# Patient Record
Sex: FEMALE | Race: ASIAN | NOT HISPANIC OR LATINO | URBAN - METROPOLITAN AREA
[De-identification: names, ages, dates, MRNs, and addresses within clinical notes are randomized per-mention and may not be internally consistent; named-entity substitution may affect disease eponyms.]

---

## 2019-02-19 ENCOUNTER — INPATIENT (INPATIENT)
Facility: HOSPITAL | Age: 24
LOS: 2 days | Discharge: HOME | End: 2019-02-22
Attending: PSYCHIATRY & NEUROLOGY | Admitting: PSYCHIATRY & NEUROLOGY
Payer: MEDICAID

## 2019-02-19 VITALS
TEMPERATURE: 98 F | OXYGEN SATURATION: 100 % | HEART RATE: 70 BPM | SYSTOLIC BLOOD PRESSURE: 115 MMHG | RESPIRATION RATE: 14 BRPM | DIASTOLIC BLOOD PRESSURE: 75 MMHG

## 2019-02-19 DIAGNOSIS — T14.91XA SUICIDE ATTEMPT, INITIAL ENCOUNTER: ICD-10-CM

## 2019-02-19 DIAGNOSIS — F32.9 MAJOR DEPRESSIVE DISORDER, SINGLE EPISODE, UNSPECIFIED: ICD-10-CM

## 2019-02-19 LAB
ALBUMIN SERPL ELPH-MCNC: 4.6 G/DL — SIGNIFICANT CHANGE UP (ref 3.3–5)
ALP SERPL-CCNC: 61 U/L — SIGNIFICANT CHANGE UP (ref 40–120)
ALT FLD-CCNC: 13 U/L — SIGNIFICANT CHANGE UP (ref 4–33)
AMPHET UR-MCNC: NEGATIVE — SIGNIFICANT CHANGE UP
ANION GAP SERPL CALC-SCNC: 15 MMO/L — HIGH (ref 7–14)
APAP SERPL-MCNC: < 15 UG/ML — LOW (ref 15–25)
APAP SERPL-MCNC: < 15 UG/ML — LOW (ref 15–25)
APTT BLD: 25.5 SEC — LOW (ref 27.5–36.3)
AST SERPL-CCNC: 20 U/L — SIGNIFICANT CHANGE UP (ref 4–32)
BARBITURATES UR SCN-MCNC: NEGATIVE — SIGNIFICANT CHANGE UP
BASE EXCESS BLDV CALC-SCNC: 2.2 MMOL/L — SIGNIFICANT CHANGE UP
BASE EXCESS BLDV CALC-SCNC: 2.5 MMOL/L — SIGNIFICANT CHANGE UP
BASOPHILS # BLD AUTO: 0.04 K/UL — SIGNIFICANT CHANGE UP (ref 0–0.2)
BASOPHILS NFR BLD AUTO: 0.6 % — SIGNIFICANT CHANGE UP (ref 0–2)
BENZODIAZ UR-MCNC: NEGATIVE — SIGNIFICANT CHANGE UP
BILIRUB SERPL-MCNC: 0.6 MG/DL — SIGNIFICANT CHANGE UP (ref 0.2–1.2)
BLOOD GAS VENOUS - CREATININE: 0.69 MG/DL — SIGNIFICANT CHANGE UP (ref 0.5–1.3)
BLOOD GAS VENOUS - CREATININE: 0.71 MG/DL — SIGNIFICANT CHANGE UP (ref 0.5–1.3)
BUN SERPL-MCNC: 12 MG/DL — SIGNIFICANT CHANGE UP (ref 7–23)
CALCIUM SERPL-MCNC: 9.6 MG/DL — SIGNIFICANT CHANGE UP (ref 8.4–10.5)
CANNABINOIDS UR-MCNC: NEGATIVE — SIGNIFICANT CHANGE UP
CHLORIDE BLDV-SCNC: 108 MMOL/L — SIGNIFICANT CHANGE UP (ref 96–108)
CHLORIDE BLDV-SCNC: 108 MMOL/L — SIGNIFICANT CHANGE UP (ref 96–108)
CHLORIDE SERPL-SCNC: 100 MMOL/L — SIGNIFICANT CHANGE UP (ref 98–107)
CO2 SERPL-SCNC: 26 MMOL/L — SIGNIFICANT CHANGE UP (ref 22–31)
COCAINE METAB.OTHER UR-MCNC: NEGATIVE — SIGNIFICANT CHANGE UP
CREAT SERPL-MCNC: 0.77 MG/DL — SIGNIFICANT CHANGE UP (ref 0.5–1.3)
EOSINOPHIL # BLD AUTO: 0.03 K/UL — SIGNIFICANT CHANGE UP (ref 0–0.5)
EOSINOPHIL NFR BLD AUTO: 0.4 % — SIGNIFICANT CHANGE UP (ref 0–6)
ETHANOL BLD-MCNC: < 10 MG/DL — SIGNIFICANT CHANGE UP
GAS PNL BLDV: 138 MMOL/L — SIGNIFICANT CHANGE UP (ref 136–146)
GAS PNL BLDV: 139 MMOL/L — SIGNIFICANT CHANGE UP (ref 136–146)
GLUCOSE BLDV-MCNC: 100 — HIGH (ref 70–99)
GLUCOSE BLDV-MCNC: 84 — SIGNIFICANT CHANGE UP (ref 70–99)
GLUCOSE SERPL-MCNC: 98 MG/DL — SIGNIFICANT CHANGE UP (ref 70–99)
HCG UR-SCNC: NEGATIVE — SIGNIFICANT CHANGE UP
HCO3 BLDV-SCNC: 25 MMOL/L — SIGNIFICANT CHANGE UP (ref 20–27)
HCO3 BLDV-SCNC: 26 MMOL/L — SIGNIFICANT CHANGE UP (ref 20–27)
HCT VFR BLD CALC: 40.9 % — SIGNIFICANT CHANGE UP (ref 34.5–45)
HCT VFR BLDV CALC: 36.6 % — SIGNIFICANT CHANGE UP (ref 34.5–45)
HCT VFR BLDV CALC: 39.6 % — SIGNIFICANT CHANGE UP (ref 34.5–45)
HGB BLD-MCNC: 13.1 G/DL — SIGNIFICANT CHANGE UP (ref 11.5–15.5)
HGB BLDV-MCNC: 11.9 G/DL — SIGNIFICANT CHANGE UP (ref 11.5–15.5)
HGB BLDV-MCNC: 12.9 G/DL — SIGNIFICANT CHANGE UP (ref 11.5–15.5)
IMM GRANULOCYTES NFR BLD AUTO: 0.1 % — SIGNIFICANT CHANGE UP (ref 0–1.5)
INR BLD: 1.03 — SIGNIFICANT CHANGE UP (ref 0.88–1.17)
LACTATE BLDV-MCNC: 1.9 MMOL/L — SIGNIFICANT CHANGE UP (ref 0.5–2)
LACTATE BLDV-MCNC: 2.5 MMOL/L — HIGH (ref 0.5–2)
LYMPHOCYTES # BLD AUTO: 1.86 K/UL — SIGNIFICANT CHANGE UP (ref 1–3.3)
LYMPHOCYTES # BLD AUTO: 27.5 % — SIGNIFICANT CHANGE UP (ref 13–44)
MCHC RBC-ENTMCNC: 29.8 PG — SIGNIFICANT CHANGE UP (ref 27–34)
MCHC RBC-ENTMCNC: 32 % — SIGNIFICANT CHANGE UP (ref 32–36)
MCV RBC AUTO: 93.2 FL — SIGNIFICANT CHANGE UP (ref 80–100)
METHADONE UR-MCNC: NEGATIVE — SIGNIFICANT CHANGE UP
MONOCYTES # BLD AUTO: 0.47 K/UL — SIGNIFICANT CHANGE UP (ref 0–0.9)
MONOCYTES NFR BLD AUTO: 6.9 % — SIGNIFICANT CHANGE UP (ref 2–14)
NEUTROPHILS # BLD AUTO: 4.36 K/UL — SIGNIFICANT CHANGE UP (ref 1.8–7.4)
NEUTROPHILS NFR BLD AUTO: 64.5 % — SIGNIFICANT CHANGE UP (ref 43–77)
NRBC # FLD: 0 K/UL — LOW (ref 25–125)
OPIATES UR-MCNC: NEGATIVE — SIGNIFICANT CHANGE UP
OXYCODONE UR-MCNC: NEGATIVE — SIGNIFICANT CHANGE UP
PCO2 BLDV: 46 MMHG — SIGNIFICANT CHANGE UP (ref 41–51)
PCO2 BLDV: 48 MMHG — SIGNIFICANT CHANGE UP (ref 41–51)
PCP UR-MCNC: NEGATIVE — SIGNIFICANT CHANGE UP
PH BLDV: 7.37 PH — SIGNIFICANT CHANGE UP (ref 7.32–7.43)
PH BLDV: 7.39 PH — SIGNIFICANT CHANGE UP (ref 7.32–7.43)
PLATELET # BLD AUTO: 248 K/UL — SIGNIFICANT CHANGE UP (ref 150–400)
PMV BLD: 9.4 FL — SIGNIFICANT CHANGE UP (ref 7–13)
PO2 BLDV: 30 MMHG — LOW (ref 35–40)
PO2 BLDV: 47 MMHG — HIGH (ref 35–40)
POTASSIUM BLDV-SCNC: 3.3 MMOL/L — LOW (ref 3.4–4.5)
POTASSIUM BLDV-SCNC: 3.4 MMOL/L — SIGNIFICANT CHANGE UP (ref 3.4–4.5)
POTASSIUM SERPL-MCNC: 3.4 MMOL/L — LOW (ref 3.5–5.3)
POTASSIUM SERPL-SCNC: 3.4 MMOL/L — LOW (ref 3.5–5.3)
PROT SERPL-MCNC: 8.1 G/DL — SIGNIFICANT CHANGE UP (ref 6–8.3)
PROTHROM AB SERPL-ACNC: 11.8 SEC — SIGNIFICANT CHANGE UP (ref 9.8–13.1)
RBC # BLD: 4.39 M/UL — SIGNIFICANT CHANGE UP (ref 3.8–5.2)
RBC # FLD: 11.9 % — SIGNIFICANT CHANGE UP (ref 10.3–14.5)
SALICYLATES SERPL-MCNC: 8.8 MG/DL — LOW (ref 15–30)
SALICYLATES SERPL-MCNC: < 5 MG/DL — LOW (ref 15–30)
SAO2 % BLDV: 52.1 % — LOW (ref 60–85)
SAO2 % BLDV: 80.9 % — SIGNIFICANT CHANGE UP (ref 60–85)
SODIUM SERPL-SCNC: 141 MMOL/L — SIGNIFICANT CHANGE UP (ref 135–145)
SP GR UR: 1.01 — SIGNIFICANT CHANGE UP (ref 1–1.03)
T3 SERPL-MCNC: 91.5 NG/DL — SIGNIFICANT CHANGE UP (ref 80–200)
T4 AB SER-ACNC: 6.03 UG/DL — SIGNIFICANT CHANGE UP (ref 5.1–13)
TSH SERPL-MCNC: 3.36 UIU/ML — SIGNIFICANT CHANGE UP (ref 0.27–4.2)
WBC # BLD: 6.77 K/UL — SIGNIFICANT CHANGE UP (ref 3.8–10.5)
WBC # FLD AUTO: 6.77 K/UL — SIGNIFICANT CHANGE UP (ref 3.8–10.5)

## 2019-02-19 PROCEDURE — 99285 EMERGENCY DEPT VISIT HI MDM: CPT | Mod: GC

## 2019-02-19 RX ORDER — ACETAMINOPHEN 500 MG
650 TABLET ORAL ONCE
Qty: 0 | Refills: 0 | Status: DISCONTINUED | OUTPATIENT
Start: 2019-02-19 | End: 2019-02-19

## 2019-02-19 RX ORDER — SODIUM CHLORIDE 9 MG/ML
1000 INJECTION, SOLUTION INTRAVENOUS ONCE
Qty: 0 | Refills: 0 | Status: COMPLETED | OUTPATIENT
Start: 2019-02-19 | End: 2019-02-19

## 2019-02-19 RX ADMIN — SODIUM CHLORIDE 1000 MILLILITER(S): 9 INJECTION, SOLUTION INTRAVENOUS at 11:51

## 2019-02-19 NOTE — ED BEHAVIORAL HEALTH ASSESSMENT NOTE - DESCRIPTION
Patient given a bolus of lactated ringers. The patient was calm and cooperative. Patient has a history of an ovarian cyst. Patient lives with her family. 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 She is reportedly a straight A student. She babysits as well. She identifies as female and heterosexual. Patient given a bolus of lactated ringers. The patient was calm and cooperative, no agitation, no prns required.    Vital Signs Last 24 Hrs  T(C): 36.8 (19 Feb 2019 20:45), Max: 36.8 (19 Feb 2019 20:45)  T(F): 98.3 (19 Feb 2019 20:45), Max: 98.3 (19 Feb 2019 20:45)  HR: 62 (19 Feb 2019 20:45) (62 - 75)  BP: 104/71 (19 Feb 2019 20:45) (96/58 - 135/72)  BP(mean): --  RR: 18 (19 Feb 2019 21:51) (14 - 18)  SpO2: 100% (19 Feb 2019 20:45) (100% - 100%)

## 2019-02-19 NOTE — ED BEHAVIORAL HEALTH ASSESSMENT NOTE - DETAILS
Patient attempted suicide by overdose this morning on Excedrin. Patient's father has DM. Spoke to patient's family. Handoff provided to Dr. Vasquez. Handoff provided to [facility], Dr. Vasquez.

## 2019-02-19 NOTE — ED ADULT TRIAGE NOTE - CHIEF COMPLAINT QUOTE
Pt took 15 excedrin at 0840h.  Says she feels depressed.  No PMH.  C/O CP.  Denies SOB.  Denies drug/alcohol use, hallucinations or hearing voices Pt took 15 excedrin at 0840h.  Says she feels depressed.  Says she wanted to kill herself.  No PMH.  C/O CP.  Denies SOB.  Denies drug/alcohol use, hallucinations or hearing voices

## 2019-02-19 NOTE — ED PROVIDER NOTE - CLINICAL SUMMARY MEDICAL DECISION MAKING FREE TEXT BOX
23f here for attempted suicide w ingestion of ~15 excedrin pills 830 this am. Tearful on exam, endorsing persistent SI. No evidence trauma. Appears anxious and depressed w poor eye contact. Otherwise benign exam, w nl reactive pupils, no diaphoresis/dry skin, no resp distress + clear lungs, soft NT abd. Will check urine and serum tox, upreg, basic labs incl vbg, one-to-one, likely adm to psych if no evidence severe intox
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
## 2019-02-19 NOTE — ED ADULT NURSE NOTE - CHIEF COMPLAINT QUOTE
Pt took 15 excedrin at 0840h.  Says she feels depressed.  Says she wanted to kill herself.  No PMH.  C/O CP.  Denies SOB.  Denies drug/alcohol use, hallucinations or hearing voices

## 2019-02-19 NOTE — ED ADULT NURSE REASSESSMENT NOTE - NS ED NURSE REASSESS COMMENT FT1
pt admitted to [facility] for suicidal attempt. pt received on CO with ed tech at bedside pt remains safe and in NAD. pt medically cleared by MD Crowell who states pt safe to be transferred to [facility]. pt report gvn to Susan DICKERSON on [facility] pt IV removed. waiting for transport with security.

## 2019-02-19 NOTE — ED PROVIDER NOTE - ATTENDING CONTRIBUTION TO CARE
24yo f no pmh here for suicide attempt by ingestion of Excedrin. Pt states she took 10-15 (not sure exact #) pills of Excedrin this morning at ~8:30 in attempt to end her life.  Physical complaints currently include headache, chest pain and abd pain, all of which pt states were present prior to ingestion as well, which she attributes to not getting sleep last few nights. When asked why states has been busy studying and working. Denies SOB, vomiting (though has nausea). No tinnitus. Denies ingestion of any other substance today. Occasional cigarette and e-cig smoker ~1x/week, drinks ETOH a few times/year for special occasions, denies other drug use. States has been feeling sad for some time but does not elaborate on why or exactly how long. Lives at home w family w whom feels safe. Has talked about feeling sad with boyfriend and friends but never w any professional. Currently still endorsing SI, which states has attempted previously but will not elaborate. No HI. On exam: VSS + depressed,  lungs, heart, pulses, abdomen, neuro, extremities, skin, all normal on exam.  IMP: Suicide attempt with Excedrin (acetaminophen, aspirin, caffeine) Likely non lethal dose based on history. Plan: 1:1 psych hold, labs tox EKG monitor, consult Tox, psych consult when cleared medically

## 2019-02-19 NOTE — ED PROVIDER NOTE - PROGRESS NOTE DETAILS
4h levels acetaminophen and salicylate negative (time stamp not representative of when labs were drawn). Pt medically stable, called psych, will come to eval AJM: pt seen by  and will at admit to . medically cleared

## 2019-02-19 NOTE — ED PROVIDER NOTE - OBJECTIVE STATEMENT
22yo f no pmh here for suicide attempt by ingestion of Excedrin. Pt states she took 10-15 (not sure exact #) pills of Excedrin this morning at ~8:30 in attempt to end her life.  Physical complaints currently include headache, chest pain and abd pain, all of which pt states were present prior to ingestion as well, which she attributes to not getting sleep last few nights. When asked why  has been busy studying and working. Denies SOB, vomiting (though has nausea). No tinnitus. Denies ingestion of any other substance today. Occasional cigarette and e-cig smoker ~1x/week, drinks ETOH a few times/year for special occasions, denies other drug use.  has been feeling sad for some time but does not elaborate on why or exactly how long. Lives at home w family w whom feels safe. Has talked about feeling sad with boyfriend and friends but never w any professional. Currently still endorsing SI, which  has attempted previously but will not elaborate. No HI.

## 2019-02-19 NOTE — CONSULT NOTE ADULT - SUBJECTIVE AND OBJECTIVE BOX
MEDICAL TOXICOLOGY CONSULT    HPI: 22 yo F no PMH p/w intentional OD of handful of excedrin (ASA, APAP, caffeine) in a single ingestion around 8-8:30am. Denies co-ingestion. Reports nausea w/o vomiting, generalized headache, CP, SOB, and diffuse abdominal pain but reports all of these symptoms starting yesterday. Denies any ingestions prior to today. She is not on any Rx or herbals and occasionally takes Excedrin for headache. Father also at the bedside - reports patient w/o a history of psych, suicidal ideation, overdose in the past and is surprised that she attempted suicide. In the ED - patient HD stable, not appearing sympathomimetic, and no Kussmaul breathing. Labs pending.     ONSET / TIME of exposure(s): 8-8:30 am today  QUANTITY of exposure(s): "Handful"   ROUTE of exposure:  PO  CONTEXT of exposure: Intentional suicide      PAST MEDICAL & SURGICAL HISTORY:  No pertinent past medical history      MEDICATION HISTORY: Denies    RECREATIONAL / ETHANOL / SUPPLEMENT USE: Denies tobacco, EtOH, illicit drug use, Herbal/Supplements    ALLERGIES: Kiwi (Angioedema)  No Known Drug Allergies      SOCIAL Hx:  Lives at home with parents. No social concerns per father or patient    FAMILY HISTORY: Non-Contributory    REVIEW OF SYSTEMS:   General:  no fever, chills, malaise,   Eyes:  no blurry vision, double vision,   ENT:  no tinnitus, decreased acuity, epistaxis, sore throat, dysphagia  Cardiac: +chest pain, (-) syncope, or palpitations  Lung:  no cough, (+) shortness of breath, (-) stridor, or wheezing  GI:  (+) abdominal pain, (+) nausea,(-)  vomiting, diarrhea, or constipation  Neuro:  no headache, weakness/numbness, ataxia, change in speech, dizziness, tremor, or seizure  Psych: (+) recent depression/anxiety ; (+) suicidal ideation/attempts, (-) homicideal       PHYSICAL EXAM  Vital Signs Last 24 Hrs  T(C): 36.6 (19 Feb 2019 10:03), Max: 36.6 (19 Feb 2019 10:03)  T(F): 97.9 (19 Feb 2019 10:03), Max: 97.9 (19 Feb 2019 10:03)  HR: 75 (19 Feb 2019 11:54) (70 - 75)  BP: 135/72 (19 Feb 2019 11:54) (115/75 - 135/72)  BP(mean): --  RR: 16 (19 Feb 2019 11:54) (14 - 16)  SpO2: 100% (19 Feb 2019 11:54) (100% - 100%)  General:  Sitting up, in no distress  Head:  normocephalic & atraumatic  Eyes:  extra-occular movement is intact  Pupils:  4mm, symmetric, reactive to light  Ear, nose, throat:  mucosa is moist and dentition is intact  Neck:  supple, (-) rigidity, full ROM without limitation  Respiratory:  Lungs are clear to auscultation, (-) wheezes/rales or rhonchi; moving air without effort   Cardiac: Regular rate and rhythm, (-) murmers, rubs or gallops.  Abdomen:  Soft, nondistended, nontender, +bowel sounds, no organomegaly,  (-) hepatosplenomegaly appreciated  :  deferred  Skin:  dry and pink, turgor is WNL  Neurologic: Cranial nerves II-XII intact, Muscle strength 5/5 upper and lower extremities, (-) Clonus, (-) Tremor, (-) Rigidity; Reflexes are 2+ and symmetric throughout; Sensation is grossly intact, including V1-V3  Psychiatric: AAOx3,  Insight is intact, Memory is intact, Affect is DEPRESSED      SIGNIFICANT LABORATORY STUDIES:                        13.1   6.77  )-----------( 248      ( 19 Feb 2019 11:00 )             40.9           11:00 - VBG - pH: 7.37  | pCO2: 48    | pO2: 30    | Lactate: 2.5

## 2019-02-19 NOTE — ED BEHAVIORAL HEALTH ASSESSMENT NOTE - SUMMARY
23 year old single [place] female with unspecified depressive disorder, rule out borderline personality disorder, rule out generalized anxiety disorder, no prior inpatient psychiatric hospitalizations, no prior suicide attempts, presenting after she overdosed on Excedrin in a suicide attempt. The patient had contemplated killing herself for 3 hours prior to this. Given this, the patient is an acute risk to herself and requires inpatient psychiatric hospitalization.

## 2019-02-19 NOTE — CONSULT NOTE ADULT - ASSESSMENT
24 yo F no PMH p/w intentional OD of handful of excedrin (ASA, APAP, caffeine) in a single ingestion around 8-8:30am. HD stable and w/o signs of toxicity at present, pending labs.    Excedrin - Acetaminophen, ASA, and Caffeine, often used for headaches. In OD, patients can present w/:  --Caffeine:  sympathomimetic toxicity) - diaphoresis, agitation/AMS, dilated pupils, tachycardia/HTN, increased respirations.   ---ASA: Tinnitus, mixed resp alk + met acidosis, AMS, arrythmia Kussmaul breathing from acidosis. Can result in CNS toxicity.   ----APAP: Acutely patients may experience abdominal discomfort and N/V, but otherwise, are well appearing and toxicity is best assessed w/ serum APAP level after 4 hr from a single ingestion which determines if IV NAC is indicated.

## 2019-02-19 NOTE — ED BEHAVIORAL HEALTH ASSESSMENT NOTE - CASE SUMMARY
23 year old single [place] female with unspecified depressive disorder, rule out borderline personality disorder, rule out generalized anxiety disorder, no prior inpatient psychiatric hospitalizations, no prior suicide attempts, presenting after she overdosed on Excedrin in a premediated suicide attempt. The patient represents an acute risk to self and requires inpatient psychiatric hospitalization.  Admit to [address] on a 9.39, involuntary legal status.  No need for constant observation in a locked, supervised setting.

## 2019-02-19 NOTE — ED BEHAVIORAL HEALTH ASSESSMENT NOTE - RISK ASSESSMENT
The patient has chronic risk factors, including depressive disorder, chronic suicidal ideation, and borderline traits. She has acute risk factors, including recent sleepless night, recent suicide attempt, recent depressive symptoms. She has protective factors, including future orientation, supportive family, identification of reasons for living. Given their strength, the patient's risk factors outweigh her protective factors, and she poses an acute risk to herself. Elevated, high risk.  The patient has chronic risk factors, including depressive disorder, chronic suicidal ideation, and borderline traits. She has acute risk factors, including recent sleepless night, recent suicide attempt, recent depressive symptoms. She has protective factors, including future orientation, supportive family, identification of reasons for living. Given their strength, the patient's risk factors outweigh her protective factors, and she poses an acute risk to herself.

## 2019-02-19 NOTE — ED ADULT NURSE NOTE - OBJECTIVE STATEMENT
pt BIBA for OD on multi. tables of Excretin this AM as per pt has depression x few yrs. pt stated took meds for HA /not sleeping at night time pt not f/u by psych. pt stated is no used to take pysch meds because it's just temporary and will not help. pt cooperative, following instructions,  pt stated have try 1 previous time in the past.  denies any family conflicts pt arrive with father and significant other. IV to left AC 20g by EMS,  labs done, all belongings to family, pt at first did not want tell what had happen to Er staff, pt was able to elaborate to me she is calm crying when she speaks IV fluids started pending dispo.      Virginia Ocasio RN

## 2019-02-20 PROCEDURE — 99221 1ST HOSP IP/OBS SF/LOW 40: CPT

## 2019-02-20 RX ORDER — LANOLIN ALCOHOL/MO/W.PET/CERES
3 CREAM (GRAM) TOPICAL AT BEDTIME
Qty: 0 | Refills: 0 | Status: DISCONTINUED | OUTPATIENT
Start: 2019-02-20 | End: 2019-02-22

## 2019-02-21 PROCEDURE — 99232 SBSQ HOSP IP/OBS MODERATE 35: CPT

## 2019-02-22 VITALS — RESPIRATION RATE: 19 BRPM | TEMPERATURE: 98 F

## 2019-02-22 PROCEDURE — 99238 HOSP IP/OBS DSCHRG MGMT 30/<: CPT

## 2019-02-22 RX ORDER — INFLUENZA VIRUS VACCINE 15; 15; 15; 15 UG/.5ML; UG/.5ML; UG/.5ML; UG/.5ML
0.5 SUSPENSION INTRAMUSCULAR ONCE
Qty: 0 | Refills: 0 | Status: COMPLETED | OUTPATIENT
Start: 2019-02-22 | End: 2019-02-22

## 2019-02-22 RX ADMIN — INFLUENZA VIRUS VACCINE 0.5 MILLILITER(S): 15; 15; 15; 15 SUSPENSION INTRAMUSCULAR at 11:32

## 2019-06-29 ENCOUNTER — EMERGENCY (EMERGENCY)
Facility: HOSPITAL | Age: 24
LOS: 1 days | Discharge: HOME | End: 2019-06-29
Attending: EMERGENCY MEDICINE
Payer: MEDICAID

## 2019-06-29 VITALS
HEIGHT: 60 IN | WEIGHT: 119.93 LBS | DIASTOLIC BLOOD PRESSURE: 82 MMHG | SYSTOLIC BLOOD PRESSURE: 117 MMHG | TEMPERATURE: 98 F | HEART RATE: 82 BPM | RESPIRATION RATE: 18 BRPM

## 2019-06-29 VITALS
RESPIRATION RATE: 17 BRPM | TEMPERATURE: 98 F | OXYGEN SATURATION: 100 % | HEART RATE: 80 BPM | DIASTOLIC BLOOD PRESSURE: 76 MMHG | SYSTOLIC BLOOD PRESSURE: 118 MMHG

## 2019-06-29 LAB
ALBUMIN SERPL ELPH-MCNC: 4.4 G/DL — SIGNIFICANT CHANGE UP (ref 3.3–5)
ALP SERPL-CCNC: 65 U/L — SIGNIFICANT CHANGE UP (ref 40–120)
ALT FLD-CCNC: 11 U/L — SIGNIFICANT CHANGE UP (ref 10–45)
ANION GAP SERPL CALC-SCNC: 14 MMOL/L — SIGNIFICANT CHANGE UP (ref 5–17)
APTT BLD: 32.2 SEC — SIGNIFICANT CHANGE UP (ref 27.5–36.3)
AST SERPL-CCNC: 17 U/L — SIGNIFICANT CHANGE UP (ref 10–40)
BASOPHILS # BLD AUTO: 0.1 K/UL — SIGNIFICANT CHANGE UP (ref 0–0.2)
BASOPHILS NFR BLD AUTO: 0.6 % — SIGNIFICANT CHANGE UP (ref 0–2)
BILIRUB SERPL-MCNC: 0.3 MG/DL — SIGNIFICANT CHANGE UP (ref 0.2–1.2)
BLD GP AB SCN SERPL QL: NEGATIVE — SIGNIFICANT CHANGE UP
BUN SERPL-MCNC: 11 MG/DL — SIGNIFICANT CHANGE UP (ref 7–23)
CALCIUM SERPL-MCNC: 9.5 MG/DL — SIGNIFICANT CHANGE UP (ref 8.4–10.5)
CHLORIDE SERPL-SCNC: 101 MMOL/L — SIGNIFICANT CHANGE UP (ref 96–108)
CO2 SERPL-SCNC: 24 MMOL/L — SIGNIFICANT CHANGE UP (ref 22–31)
CREAT SERPL-MCNC: 0.92 MG/DL — SIGNIFICANT CHANGE UP (ref 0.5–1.3)
EOSINOPHIL # BLD AUTO: 0.3 K/UL — SIGNIFICANT CHANGE UP (ref 0–0.5)
EOSINOPHIL NFR BLD AUTO: 3.5 % — SIGNIFICANT CHANGE UP (ref 0–6)
GLUCOSE SERPL-MCNC: 92 MG/DL — SIGNIFICANT CHANGE UP (ref 70–99)
HCG SERPL-ACNC: <2 MIU/ML — SIGNIFICANT CHANGE UP
HCT VFR BLD CALC: 40.3 % — SIGNIFICANT CHANGE UP (ref 34.5–45)
HGB BLD-MCNC: 14 G/DL — SIGNIFICANT CHANGE UP (ref 11.5–15.5)
INR BLD: 0.96 RATIO — SIGNIFICANT CHANGE UP (ref 0.88–1.16)
LYMPHOCYTES # BLD AUTO: 3.3 K/UL — SIGNIFICANT CHANGE UP (ref 1–3.3)
LYMPHOCYTES # BLD AUTO: 36.1 % — SIGNIFICANT CHANGE UP (ref 13–44)
MCHC RBC-ENTMCNC: 32.7 PG — SIGNIFICANT CHANGE UP (ref 27–34)
MCHC RBC-ENTMCNC: 34.7 GM/DL — SIGNIFICANT CHANGE UP (ref 32–36)
MCV RBC AUTO: 94.1 FL — SIGNIFICANT CHANGE UP (ref 80–100)
MONOCYTES # BLD AUTO: 0.6 K/UL — SIGNIFICANT CHANGE UP (ref 0–0.9)
MONOCYTES NFR BLD AUTO: 7 % — SIGNIFICANT CHANGE UP (ref 2–14)
NEUTROPHILS # BLD AUTO: 4.8 K/UL — SIGNIFICANT CHANGE UP (ref 1.8–7.4)
NEUTROPHILS NFR BLD AUTO: 52.7 % — SIGNIFICANT CHANGE UP (ref 43–77)
PLATELET # BLD AUTO: 270 K/UL — SIGNIFICANT CHANGE UP (ref 150–400)
POTASSIUM SERPL-MCNC: 4.4 MMOL/L — SIGNIFICANT CHANGE UP (ref 3.5–5.3)
POTASSIUM SERPL-SCNC: 4.4 MMOL/L — SIGNIFICANT CHANGE UP (ref 3.5–5.3)
PROT SERPL-MCNC: 7.7 G/DL — SIGNIFICANT CHANGE UP (ref 6–8.3)
PROTHROM AB SERPL-ACNC: 10.9 SEC — SIGNIFICANT CHANGE UP (ref 10–12.9)
RBC # BLD: 4.28 M/UL — SIGNIFICANT CHANGE UP (ref 3.8–5.2)
RBC # FLD: 11.5 % — SIGNIFICANT CHANGE UP (ref 10.3–14.5)
RH IG SCN BLD-IMP: POSITIVE — SIGNIFICANT CHANGE UP
SODIUM SERPL-SCNC: 139 MMOL/L — SIGNIFICANT CHANGE UP (ref 135–145)
WBC # BLD: 9 K/UL — SIGNIFICANT CHANGE UP (ref 3.8–10.5)
WBC # FLD AUTO: 9 K/UL — SIGNIFICANT CHANGE UP (ref 3.8–10.5)

## 2019-06-29 PROCEDURE — 86901 BLOOD TYPING SEROLOGIC RH(D): CPT

## 2019-06-29 PROCEDURE — 85027 COMPLETE CBC AUTOMATED: CPT

## 2019-06-29 PROCEDURE — 80053 COMPREHEN METABOLIC PANEL: CPT

## 2019-06-29 PROCEDURE — 76817 TRANSVAGINAL US OBSTETRIC: CPT

## 2019-06-29 PROCEDURE — 84702 CHORIONIC GONADOTROPIN TEST: CPT

## 2019-06-29 PROCEDURE — 99284 EMERGENCY DEPT VISIT MOD MDM: CPT

## 2019-06-29 PROCEDURE — 93975 VASCULAR STUDY: CPT

## 2019-06-29 PROCEDURE — 86900 BLOOD TYPING SEROLOGIC ABO: CPT

## 2019-06-29 PROCEDURE — 76817 TRANSVAGINAL US OBSTETRIC: CPT | Mod: 26

## 2019-06-29 PROCEDURE — 85730 THROMBOPLASTIN TIME PARTIAL: CPT

## 2019-06-29 PROCEDURE — 86850 RBC ANTIBODY SCREEN: CPT

## 2019-06-29 PROCEDURE — 85610 PROTHROMBIN TIME: CPT

## 2019-06-29 PROCEDURE — 93975 VASCULAR STUDY: CPT | Mod: 26

## 2019-06-29 NOTE — ED ADULT TRIAGE NOTE - NS ED NURSE DIRECT TO ROOM YN
No
You can access the [facility] Patient Portal, offered by [facility], by registering with the following website: [email or web address]

## 2019-06-29 NOTE — ED PROVIDER NOTE - NSFOLLOWUPCLINICS_GEN_ALL_ED_FT
[facility]  OB/GYN & Surg  [address] [place] [id number]  Phone: [phone]  Fax:   Follow Up Time:     [facility] Gynecology and Obstetrics  Gynceology/OB  [address] [place] [address]  Phone: [phone]  Fax:   Follow Up Time:

## 2019-06-29 NOTE — ED PROVIDER NOTE - ATTENDING CONTRIBUTION TO CARE
vag bleed, did have pos preg test / neg test / pos test. approximately 8 wks since last nl period. no abdominal pain  abdomen non-tender.   hcg / eval for ectopic. labs, us, type. pt states she does not want to be pregnant.    us shows no preg but hcg neg. will dc w ob f/u - flow is cw her normal period - pt probably miscarried.

## 2019-06-29 NOTE — ED ADULT NURSE REASSESSMENT NOTE - NS ED NURSE REASSESS COMMENT FT1
Report received from Gladys DICKERSON . Pt AAOx4, NAD, resp nonlabored, skin warm/dry, resting comfortably in bed with family at bedside. Pt has no complaints at this time. Pt denies headache, dizziness, chest pain, palpitations, SOB, abd pain, n/v/d, urinary symptoms, fevers, chills, weakness at this time. Pt awaiting US results . Safety maintained.

## 2019-06-29 NOTE — ED PROVIDER NOTE - CLINICAL SUMMARY MEDICAL DECISION MAKING FREE TEXT BOX
24F with vaginal bleeding and cramps in setting of previous pregnancy tests, now with cramping and bleeding. Pt being evaluated for threatened . Will send beta-hcg, u/a, type and screen, transvaginal ultrasound.

## 2019-06-29 NOTE — ED ADULT NURSE NOTE - NSIMPLEMENTINTERV_GEN_ALL_ED
Implemented All Universal Safety Interventions:  [place] to call system. Call bell, personal items and telephone within reach. Instruct patient to call for assistance. Room bathroom lighting operational. Non-slip footwear when patient is off stretcher. Physically safe environment: no spills, clutter or unnecessary equipment. Stretcher in lowest position, wheels locked, appropriate side rails in place.

## 2019-06-29 NOTE — ED PROVIDER NOTE - OBJECTIVE STATEMENT
24F presenting to the ED for vaginal bleeding. Pt reports that her last menstrual period was on 5/12. She experienced spotting on 6/2. She expected her period on 6/12, so got a pregnancy test on 6/17 which was positive. A recheck of a pregnancy on 6/24 was negative. A pregnancy test today was positive. Pt reports that she developed abd cramping and bleeding, less than her periods, but more pain than usual for her periods. Pt denies fevers, chills, palpitations, chest pain, SOB, n/v/d/c

## 2019-06-29 NOTE — ED PROVIDER NOTE - NSFOLLOWUPINSTRUCTIONS_ED_ALL_ED_FT
You were seen for vaginal bleeding, this was likely from completed miscarriage. An ectopic pregnancy is still a possible diagnosis, this is a life threatening condition, therefore follow up within the next 2-3 days for another blood pregnancy test and follow up ultrasound should be done with your OB/Gyn.  If you do not have an OB/Gyn please call the number above to make an appointment.    Return to the emergency department if you have new or worsening symptoms such as worsening abdominal pain, nausea, vomiting or increased vaginal bleeding.

## 2024-01-06 NOTE — ED ADULT TRIAGE NOTE - AS O2 DELIVERY
Sinus on ECG. Likely iso hypovolemia, possibly infection in immunocompromised patient. Note QTc 587  - Fluid resuscitation as above  - Blood cultures x2, urine culture pending  - Monitor off antibiotics as not febrile  - Obatin TTE - patient mentions that she was told she has a "hole in her heart" when she was 14  - Troponins negative room air Sinus on ECG. Likely iso hypovolemia, possibly infection in immunocompromised patient. Note QTc 587  - Fluid resuscitation as above  - Blood cultures x2, urine culture pending  - Monitor off antibiotics as not febrile  - TTE - Grossly normal, preserved EF  - Troponins negative Sinus on ECG. Likely iso hypovolemia, possibly infection in immunocompromised patient. Note QTc 587  - Fluid resuscitation as above  - Blood cultures x2, urine culture pending  - Monitor off antibiotics as not febrile  - TTE - Grossly normal, preserved EF  - Troponins negative  -EP consulted for prolonged Qtc, awaiting dc recs, likely related to electrolyte abnormalities